# Patient Record
Sex: FEMALE | Race: WHITE | NOT HISPANIC OR LATINO | Employment: UNEMPLOYED | URBAN - METROPOLITAN AREA
[De-identification: names, ages, dates, MRNs, and addresses within clinical notes are randomized per-mention and may not be internally consistent; named-entity substitution may affect disease eponyms.]

---

## 2024-04-18 ENCOUNTER — OFFICE VISIT (OUTPATIENT)
Age: 34
End: 2024-04-18
Payer: MEDICARE

## 2024-04-18 VITALS
WEIGHT: 167.11 LBS | OXYGEN SATURATION: 98 % | HEIGHT: 65 IN | RESPIRATION RATE: 16 BRPM | DIASTOLIC BLOOD PRESSURE: 62 MMHG | HEART RATE: 75 BPM | TEMPERATURE: 97.3 F | BODY MASS INDEX: 27.84 KG/M2 | SYSTOLIC BLOOD PRESSURE: 102 MMHG

## 2024-04-18 DIAGNOSIS — L24.7 IRRITANT CONTACT DERMATITIS DUE TO PLANTS, EXCEPT FOOD: Primary | ICD-10-CM

## 2024-04-18 PROCEDURE — 99213 OFFICE O/P EST LOW 20 MIN: CPT

## 2024-04-18 RX ORDER — PREDNISONE 10 MG/1
TABLET ORAL
Qty: 42 TABLET | Refills: 0 | Status: SHIPPED | OUTPATIENT
Start: 2024-04-18

## 2024-04-18 NOTE — PROGRESS NOTES
[facility]        NAME: Eli Rosado is a 33 y.o. female  : 1990    MRN: [id number]  DATE: 2024  TIME: 10:19 AM    Assessment and Plan   Irritant contact dermatitis due to plants, except food [L24.7]  1. Irritant contact dermatitis due to plants, except food  predniSONE 10 mg tablet            Patient Instructions   Take prednisone as prescribed.  Calamine cream or ice/cold 15 minutes every 3 hours as needed for itching.    Follow up with Primary Care Provider in 3-5 days if not improving.  Proceed to Emergency Department if symptoms worsen.    If tests have been performed at [facility], our office will contact you with results if changes need to be made to the care plan discussed with you at the visit.  You can review your full results on [facility].    Chief Complaint     Chief Complaint   Patient presents with   • Poison Ivy     Doing landscaping about 1.5 weeks ago and was exposed to poison ivy. Went to hospital on 24 and was given lotion, its not helping. Poison spreading on arms and legs and left side of torso.          History of Present Illness       Was doing landscaping 1.5 weeks ago and has been having a rash that started on the right wrist that has gone to her arms and is well as left hip.  States she was seen in ER about 4 days ago and was given clobetasol cream which did not improve her symptoms.    Poison Aylin  Pertinent negatives include no cough, fever or shortness of breath.       Review of Systems   Review of Systems   Constitutional:  Negative for chills and fever.   HENT:  Negative for trouble swallowing and voice change.    Respiratory:  Negative for cough and shortness of breath.    Cardiovascular:  Negative for chest pain.   Skin:  Positive for rash.   Neurological:  Negative for dizziness, weakness and light-headedness.         Current Medications       Current Outpatient Medications:   •  predniSONE 10 mg tablet, Total 12 days - 60mg Day 1 and 2, 50mg  "Day 3 and 4, 40mg Day 5 and 6, 30mg Day 7 and 8, 20mg Day 9 and 10, 10mg Day 11 and 12., Disp: 42 tablet, Rfl: 0    Current Allergies     Allergies as of 04/18/2024 - Reviewed 04/18/2024   Allergen Reaction Noted   • Penicillins Rash 02/04/2019   • Poison ivy extract Rash 04/18/2024            The following portions of the patient's history were reviewed and updated as appropriate: allergies, current medications, past family history, past medical history, past social history, past surgical history and problem list.     Past Medical History:   Diagnosis Date   • Anxiety    • Depression        History reviewed. No pertinent surgical history.    Family History   Problem Relation Age of Onset   • No Known Problems Mother    • No Known Problems Father          Medications have been verified.        Objective   /62   Pulse 75   Temp (!) 97.3 °F (36.3 °C)   Resp 16   Ht 5' 5\" (1.651 m)   Wt 75.8 kg (167 lb 1.7 oz)   SpO2 98%   Breastfeeding No   BMI 27.81 kg/m²   No LMP recorded.       Physical Exam     Physical Exam  Vitals and nursing note reviewed.   Constitutional:       Appearance: Normal appearance.   HENT:      Head: Normocephalic and atraumatic.   Pulmonary:      Effort: Pulmonary effort is normal.      Breath sounds: Normal breath sounds.   Skin:     General: Skin is warm and dry.      Capillary Refill: Capillary refill takes less than 2 seconds.      Findings: Rash present.   Neurological:      General: No focal deficit present.      Mental Status: She is alert and oriented to person, place, and time. Mental status is at baseline.      Sensory: No sensory deficit.      Motor: No weakness.   Psychiatric:         Mood and Affect: Mood normal.         Behavior: Behavior normal.         Thought Content: Thought content normal.                   "

## 2024-04-18 NOTE — PATIENT INSTRUCTIONS
Take prednisone as prescribed.  Calamine cream or ice/cold 15 minutes every 3 hours as needed for itching.    Follow up with Primary Care Provider in 3-5 days if not improving.  Proceed to Emergency Department if symptoms worsen.    If tests have been performed at [facility], our office will contact you with results if changes need to be made to the care plan discussed with you at the visit.  You can review your full results on [facility] MyChart.